# Patient Record
Sex: FEMALE | Race: WHITE | Employment: PART TIME | URBAN - METROPOLITAN AREA
[De-identification: names, ages, dates, MRNs, and addresses within clinical notes are randomized per-mention and may not be internally consistent; named-entity substitution may affect disease eponyms.]

---

## 2025-02-13 ENCOUNTER — OFFICE VISIT (OUTPATIENT)
Age: 60
End: 2025-02-13

## 2025-02-13 VITALS
DIASTOLIC BLOOD PRESSURE: 64 MMHG | WEIGHT: 155.4 LBS | HEART RATE: 72 BPM | OXYGEN SATURATION: 97 % | TEMPERATURE: 97.7 F | HEIGHT: 67 IN | SYSTOLIC BLOOD PRESSURE: 101 MMHG | BODY MASS INDEX: 24.39 KG/M2 | RESPIRATION RATE: 16 BRPM

## 2025-02-13 DIAGNOSIS — L03.213 PERIORBITAL CELLULITIS OF RIGHT EYE: Primary | ICD-10-CM

## 2025-02-13 DIAGNOSIS — L30.9 PERIORBITAL DERMATITIS: ICD-10-CM

## 2025-02-13 RX ORDER — BETAMETHASONE VALERATE 1.2 MG/G
CREAM TOPICAL
Qty: 15 G | Refills: 0 | Status: SHIPPED | OUTPATIENT
Start: 2025-02-13 | End: 2025-02-20

## 2025-02-13 RX ORDER — LAMOTRIGINE 25 MG/1
TABLET ORAL
COMMUNITY
Start: 2025-02-12

## 2025-02-13 NOTE — PROGRESS NOTES
Krystal Baldwin (:  1965) is a 59 y.o. female,New patient, here for evaluation of the following chief complaint(s):  Eye Problem (Pt presents to clinic w/ c/o redness and swelling under RT eye. Pt reports approximately 4-5 days of sx, states area may have gotten worse, but states this would be minimal. States use of ophthalmic drops. States area is not painful but is dry and pruritic.)        SUBJECTIVE/OBJECTIVE:    History provided by:  Patient  Eye Problem          59 y.o. female presents with symptoms of redness and itching under her right eye.  She states about 4 days ago she was walking her dog whe a tree branch feel and hit her in the right eye and face.  She was worried for her eye but her eye is ok, she is not having any eye pain, foreign body sensation, eye redness, vision changes, blurry vision, double vision, vision loss, flashes, floaters, or photophobia.  She denies any pain with eye movement.  She denies any eye drainage or waking with any crusting.  Over the last several days she has noticed that there has been some redness and swelling underneath her right eye.  She states the area is not painful but it is very itchy.        Vitals:    25 1231   BP: 101/64   Site: Right Upper Arm   Position: Sitting   Cuff Size: Medium Adult   Pulse: 72   Resp: 16   Temp: 97.7 °F (36.5 °C)   TempSrc: Oral   SpO2: 97%   Weight: 70.5 kg (155 lb 6.4 oz)   Height: 1.702 m (5' 7\")       No results found for this visit on 25.     Physical Exam  Constitutional:       General: She is not in acute distress.     Appearance: Normal appearance. She is not ill-appearing or toxic-appearing.   HENT:      Head: Normocephalic and atraumatic.        Comments: Patch of slightly raised erythematous skin underneath right eye, not tender to palpation, no breaks in skin, no scabbing, no ecchymosis, no drainage.  It does not involve her lower eyelid.  Eyes:      General:         Right eye: No discharge.         Left

## 2025-02-13 NOTE — PATIENT INSTRUCTIONS
Periorbital cellulitis/dermatitis -  Augmentin, twice daily for 7 days  Keep area clean and dry  Betamethasone cream, apply a thin layer twice daily, do not exceed for more than 14 days due to risk of skin thinning  Recommend follow-up with your primary care provider as soon as possible, if you are unable to get in feel free to return here or [facility] as [facility] care in the interim  If any significant worsening go to the nearest emergency room  Call or return to clinic if any concerns